# Patient Record
Sex: FEMALE | Race: WHITE | Employment: OTHER | URBAN - METROPOLITAN AREA
[De-identification: names, ages, dates, MRNs, and addresses within clinical notes are randomized per-mention and may not be internally consistent; named-entity substitution may affect disease eponyms.]

---

## 2018-09-25 ENCOUNTER — ANESTHESIA EVENT (OUTPATIENT)
Dept: SURGERY | Facility: CLINIC | Age: 73
End: 2018-09-25
Payer: COMMERCIAL

## 2018-09-26 ENCOUNTER — HOSPITAL ENCOUNTER (OUTPATIENT)
Facility: CLINIC | Age: 73
Discharge: HOME | End: 2018-09-27
Attending: OBSTETRICS & GYNECOLOGY | Admitting: OBSTETRICS & GYNECOLOGY
Payer: COMMERCIAL

## 2018-09-26 ENCOUNTER — ANESTHESIA (OUTPATIENT)
Dept: SURGERY | Facility: CLINIC | Age: 73
End: 2018-09-26
Payer: COMMERCIAL

## 2018-09-26 ENCOUNTER — SURGERY (OUTPATIENT)
Age: 73
End: 2018-09-26
Payer: COMMERCIAL

## 2018-09-26 DIAGNOSIS — N81.10 FEMALE CYSTOCELE: Primary | ICD-10-CM

## 2018-09-26 LAB
ABO + RH BLD: NORMAL
ABO + RH BLD: NORMAL
BLD GP AB SCN SERPL QL: NORMAL
BLOOD BANK CMNT PATIENT-IMP: NORMAL
SPECIMEN EXP DATE BLD: NORMAL

## 2018-09-26 PROCEDURE — 71000012 ZZH RECOVERY PHASE 1 LEVEL 1 FIRST HR: Performed by: OBSTETRICS & GYNECOLOGY

## 2018-09-26 PROCEDURE — 86900 BLOOD TYPING SEROLOGIC ABO: CPT | Performed by: OBSTETRICS & GYNECOLOGY

## 2018-09-26 PROCEDURE — 25000128 H RX IP 250 OP 636: Performed by: OBSTETRICS & GYNECOLOGY

## 2018-09-26 PROCEDURE — 25000125 ZZHC RX 250: Performed by: OBSTETRICS & GYNECOLOGY

## 2018-09-26 PROCEDURE — 25000125 ZZHC RX 250: Performed by: NURSE ANESTHETIST, CERTIFIED REGISTERED

## 2018-09-26 PROCEDURE — 40000935 ZZH STATISTIC OUTPATIENT (NON-OBS) EVE

## 2018-09-26 PROCEDURE — 37000008 ZZH ANESTHESIA TECHNICAL FEE, 1ST 30 MIN: Performed by: OBSTETRICS & GYNECOLOGY

## 2018-09-26 PROCEDURE — 25000128 H RX IP 250 OP 636: Performed by: PHYSICIAN ASSISTANT

## 2018-09-26 PROCEDURE — 25000132 ZZH RX MED GY IP 250 OP 250 PS 637: Performed by: UROLOGY

## 2018-09-26 PROCEDURE — 25000566 ZZH SEVOFLURANE, EA 15 MIN: Performed by: OBSTETRICS & GYNECOLOGY

## 2018-09-26 PROCEDURE — 36000085 ZZH SURGERY LEVEL 8 1ST 30 MIN: Performed by: OBSTETRICS & GYNECOLOGY

## 2018-09-26 PROCEDURE — C1781 MESH (IMPLANTABLE): HCPCS | Performed by: OBSTETRICS & GYNECOLOGY

## 2018-09-26 PROCEDURE — 40000934 ZZH STATISTIC OUTPATIENT (NON-OBS) DAY

## 2018-09-26 PROCEDURE — 37000009 ZZH ANESTHESIA TECHNICAL FEE, EACH ADDTL 15 MIN: Performed by: OBSTETRICS & GYNECOLOGY

## 2018-09-26 PROCEDURE — 36415 COLL VENOUS BLD VENIPUNCTURE: CPT | Performed by: OBSTETRICS & GYNECOLOGY

## 2018-09-26 PROCEDURE — 25000128 H RX IP 250 OP 636: Performed by: NURSE ANESTHETIST, CERTIFIED REGISTERED

## 2018-09-26 PROCEDURE — 71000013 ZZH RECOVERY PHASE 1 LEVEL 1 EA ADDTL HR: Performed by: OBSTETRICS & GYNECOLOGY

## 2018-09-26 PROCEDURE — 86901 BLOOD TYPING SEROLOGIC RH(D): CPT | Performed by: OBSTETRICS & GYNECOLOGY

## 2018-09-26 PROCEDURE — 86850 RBC ANTIBODY SCREEN: CPT | Performed by: OBSTETRICS & GYNECOLOGY

## 2018-09-26 PROCEDURE — 25000128 H RX IP 250 OP 636: Performed by: UROLOGY

## 2018-09-26 PROCEDURE — 25800025 ZZH RX 258: Performed by: UROLOGY

## 2018-09-26 PROCEDURE — 25000128 H RX IP 250 OP 636: Performed by: ANESTHESIOLOGY

## 2018-09-26 PROCEDURE — 88305 TISSUE EXAM BY PATHOLOGIST: CPT | Performed by: OBSTETRICS & GYNECOLOGY

## 2018-09-26 PROCEDURE — 88305 TISSUE EXAM BY PATHOLOGIST: CPT | Mod: 26 | Performed by: OBSTETRICS & GYNECOLOGY

## 2018-09-26 PROCEDURE — 40000936 ZZH STATISTIC OUTPATIENT (NON-OBS) NIGHT

## 2018-09-26 PROCEDURE — 40000169 ZZH STATISTIC PRE-PROCEDURE ASSESSMENT I: Performed by: OBSTETRICS & GYNECOLOGY

## 2018-09-26 PROCEDURE — 25000132 ZZH RX MED GY IP 250 OP 250 PS 637: Performed by: OBSTETRICS & GYNECOLOGY

## 2018-09-26 PROCEDURE — 27210794 ZZH OR GENERAL SUPPLY STERILE: Performed by: OBSTETRICS & GYNECOLOGY

## 2018-09-26 PROCEDURE — 36000087 ZZH SURGERY LEVEL 8 EA 15 ADDTL MIN: Performed by: OBSTETRICS & GYNECOLOGY

## 2018-09-26 DEVICE — MESH SLING Y SHAPE RESTORELLE 24X4CM 501420: Type: IMPLANTABLE DEVICE | Site: PELVIS | Status: FUNCTIONAL

## 2018-09-26 RX ORDER — RIZATRIPTAN BENZOATE 10 MG/1
10 TABLET, ORALLY DISINTEGRATING ORAL
Status: COMPLETED | OUTPATIENT
Start: 2018-09-26 | End: 2018-09-26

## 2018-09-26 RX ORDER — SODIUM CHLORIDE, SODIUM LACTATE, POTASSIUM CHLORIDE, CALCIUM CHLORIDE 600; 310; 30; 20 MG/100ML; MG/100ML; MG/100ML; MG/100ML
INJECTION, SOLUTION INTRAVENOUS CONTINUOUS PRN
Status: DISCONTINUED | OUTPATIENT
Start: 2018-09-26 | End: 2018-09-26

## 2018-09-26 RX ORDER — OXYCODONE AND ACETAMINOPHEN 5; 325 MG/1; MG/1
1-2 TABLET ORAL EVERY 4 HOURS PRN
Qty: 12 TABLET | Refills: 0 | Status: SHIPPED | OUTPATIENT
Start: 2018-09-26 | End: 2018-09-26

## 2018-09-26 RX ORDER — HYDROCODONE BITARTRATE AND ACETAMINOPHEN 5; 325 MG/1; MG/1
1 TABLET ORAL EVERY 4 HOURS PRN
Qty: 20 TABLET | Refills: 0 | Status: SHIPPED | OUTPATIENT
Start: 2018-09-26 | End: 2018-09-26

## 2018-09-26 RX ORDER — CEFAZOLIN SODIUM 1 G/3ML
1 INJECTION, POWDER, FOR SOLUTION INTRAMUSCULAR; INTRAVENOUS SEE ADMIN INSTRUCTIONS
Status: DISCONTINUED | OUTPATIENT
Start: 2018-09-26 | End: 2018-09-26 | Stop reason: HOSPADM

## 2018-09-26 RX ORDER — HYDROMORPHONE HYDROCHLORIDE 1 MG/ML
.3-.5 INJECTION, SOLUTION INTRAMUSCULAR; INTRAVENOUS; SUBCUTANEOUS EVERY 10 MIN PRN
Status: DISCONTINUED | OUTPATIENT
Start: 2018-09-26 | End: 2018-09-26 | Stop reason: HOSPADM

## 2018-09-26 RX ORDER — CEFAZOLIN SODIUM 1 G/3ML
1 INJECTION, POWDER, FOR SOLUTION INTRAMUSCULAR; INTRAVENOUS ONCE
Status: COMPLETED | OUTPATIENT
Start: 2018-09-26 | End: 2018-09-26

## 2018-09-26 RX ORDER — NALOXONE HYDROCHLORIDE 0.4 MG/ML
.1-.4 INJECTION, SOLUTION INTRAMUSCULAR; INTRAVENOUS; SUBCUTANEOUS
Status: DISCONTINUED | OUTPATIENT
Start: 2018-09-26 | End: 2018-09-27 | Stop reason: HOSPADM

## 2018-09-26 RX ORDER — CEFAZOLIN SODIUM 2 G/100ML
2 INJECTION, SOLUTION INTRAVENOUS
Status: DISCONTINUED | OUTPATIENT
Start: 2018-09-26 | End: 2018-09-26 | Stop reason: HOSPADM

## 2018-09-26 RX ORDER — METOCLOPRAMIDE 5 MG/1
5 TABLET ORAL EVERY 6 HOURS PRN
Status: DISCONTINUED | OUTPATIENT
Start: 2018-09-26 | End: 2018-09-27 | Stop reason: HOSPADM

## 2018-09-26 RX ORDER — LIDOCAINE HYDROCHLORIDE 20 MG/ML
INJECTION, SOLUTION INFILTRATION; PERINEURAL PRN
Status: DISCONTINUED | OUTPATIENT
Start: 2018-09-26 | End: 2018-09-26

## 2018-09-26 RX ORDER — OXYCODONE AND ACETAMINOPHEN 5; 325 MG/1; MG/1
1-2 TABLET ORAL EVERY 4 HOURS PRN
Status: DISCONTINUED | OUTPATIENT
Start: 2018-09-26 | End: 2018-09-26

## 2018-09-26 RX ORDER — FENTANYL CITRATE 50 UG/ML
25-50 INJECTION, SOLUTION INTRAMUSCULAR; INTRAVENOUS
Status: DISCONTINUED | OUTPATIENT
Start: 2018-09-26 | End: 2018-09-26 | Stop reason: HOSPADM

## 2018-09-26 RX ORDER — OXYCODONE HYDROCHLORIDE 5 MG/1
5 TABLET ORAL EVERY 6 HOURS PRN
Qty: 12 TABLET | Refills: 0 | Status: SHIPPED | OUTPATIENT
Start: 2018-09-26

## 2018-09-26 RX ORDER — HYDROMORPHONE HYDROCHLORIDE 1 MG/ML
0.2 INJECTION, SOLUTION INTRAMUSCULAR; INTRAVENOUS; SUBCUTANEOUS
Status: DISCONTINUED | OUTPATIENT
Start: 2018-09-26 | End: 2018-09-27 | Stop reason: HOSPADM

## 2018-09-26 RX ORDER — ALBUTEROL SULFATE 0.83 MG/ML
2.5 SOLUTION RESPIRATORY (INHALATION) EVERY 4 HOURS PRN
Status: DISCONTINUED | OUTPATIENT
Start: 2018-09-26 | End: 2018-09-26 | Stop reason: HOSPADM

## 2018-09-26 RX ORDER — ONDANSETRON 4 MG/1
4 TABLET, ORALLY DISINTEGRATING ORAL EVERY 6 HOURS PRN
Status: DISCONTINUED | OUTPATIENT
Start: 2018-09-26 | End: 2018-09-27 | Stop reason: HOSPADM

## 2018-09-26 RX ORDER — PHENAZOPYRIDINE HYDROCHLORIDE 200 MG/1
200 TABLET, FILM COATED ORAL ONCE
Status: COMPLETED | OUTPATIENT
Start: 2018-09-26 | End: 2018-09-26

## 2018-09-26 RX ORDER — ONDANSETRON 2 MG/ML
4 INJECTION INTRAMUSCULAR; INTRAVENOUS EVERY 6 HOURS PRN
Status: DISCONTINUED | OUTPATIENT
Start: 2018-09-26 | End: 2018-09-27 | Stop reason: HOSPADM

## 2018-09-26 RX ORDER — CEFAZOLIN SODIUM 2 G/100ML
2 INJECTION, SOLUTION INTRAVENOUS
Status: COMPLETED | OUTPATIENT
Start: 2018-09-26 | End: 2018-09-26

## 2018-09-26 RX ORDER — ONDANSETRON 4 MG/1
4 TABLET, ORALLY DISINTEGRATING ORAL EVERY 30 MIN PRN
Status: DISCONTINUED | OUTPATIENT
Start: 2018-09-26 | End: 2018-09-26 | Stop reason: HOSPADM

## 2018-09-26 RX ORDER — HYDROCODONE BITARTRATE AND ACETAMINOPHEN 5; 325 MG/1; MG/1
1-2 TABLET ORAL EVERY 4 HOURS PRN
Qty: 20 TABLET | Refills: 0 | Status: SHIPPED | OUTPATIENT
Start: 2018-09-26 | End: 2018-09-26

## 2018-09-26 RX ORDER — BUPIVACAINE HYDROCHLORIDE 2.5 MG/ML
INJECTION, SOLUTION INFILTRATION; PERINEURAL PRN
Status: DISCONTINUED | OUTPATIENT
Start: 2018-09-26 | End: 2018-09-26 | Stop reason: HOSPADM

## 2018-09-26 RX ORDER — KETOROLAC TROMETHAMINE 30 MG/ML
30 INJECTION, SOLUTION INTRAMUSCULAR; INTRAVENOUS EVERY 6 HOURS PRN
Status: DISCONTINUED | OUTPATIENT
Start: 2018-09-26 | End: 2018-09-26 | Stop reason: HOSPADM

## 2018-09-26 RX ORDER — LIDOCAINE 40 MG/G
CREAM TOPICAL
Status: DISCONTINUED | OUTPATIENT
Start: 2018-09-26 | End: 2018-09-27 | Stop reason: HOSPADM

## 2018-09-26 RX ORDER — METOCLOPRAMIDE HYDROCHLORIDE 5 MG/ML
5 INJECTION INTRAMUSCULAR; INTRAVENOUS EVERY 6 HOURS PRN
Status: DISCONTINUED | OUTPATIENT
Start: 2018-09-26 | End: 2018-09-27 | Stop reason: HOSPADM

## 2018-09-26 RX ORDER — KETOROLAC TROMETHAMINE 15 MG/ML
15 INJECTION, SOLUTION INTRAMUSCULAR; INTRAVENOUS EVERY 6 HOURS
Status: DISCONTINUED | OUTPATIENT
Start: 2018-09-26 | End: 2018-09-27

## 2018-09-26 RX ORDER — PROCHLORPERAZINE MALEATE 5 MG
5 TABLET ORAL EVERY 6 HOURS PRN
Status: DISCONTINUED | OUTPATIENT
Start: 2018-09-26 | End: 2018-09-27 | Stop reason: HOSPADM

## 2018-09-26 RX ORDER — ACETAMINOPHEN 650 MG/1
650 SUPPOSITORY RECTAL EVERY 4 HOURS PRN
Status: DISCONTINUED | OUTPATIENT
Start: 2018-09-26 | End: 2018-09-26 | Stop reason: HOSPADM

## 2018-09-26 RX ORDER — PROPOFOL 10 MG/ML
INJECTION, EMULSION INTRAVENOUS PRN
Status: DISCONTINUED | OUTPATIENT
Start: 2018-09-26 | End: 2018-09-26

## 2018-09-26 RX ORDER — ONDANSETRON 2 MG/ML
4 INJECTION INTRAMUSCULAR; INTRAVENOUS EVERY 30 MIN PRN
Status: DISCONTINUED | OUTPATIENT
Start: 2018-09-26 | End: 2018-09-26 | Stop reason: HOSPADM

## 2018-09-26 RX ORDER — ONDANSETRON 2 MG/ML
INJECTION INTRAMUSCULAR; INTRAVENOUS PRN
Status: DISCONTINUED | OUTPATIENT
Start: 2018-09-26 | End: 2018-09-26

## 2018-09-26 RX ORDER — CARBOXYMETHYLCELLULOSE SODIUM 5 MG/ML
1 SOLUTION/ DROPS OPHTHALMIC DAILY PRN
Status: ON HOLD | COMMUNITY
End: 2018-09-26

## 2018-09-26 RX ORDER — NALOXONE HYDROCHLORIDE 0.4 MG/ML
.1-.4 INJECTION, SOLUTION INTRAMUSCULAR; INTRAVENOUS; SUBCUTANEOUS
Status: DISCONTINUED | OUTPATIENT
Start: 2018-09-26 | End: 2018-09-26 | Stop reason: HOSPADM

## 2018-09-26 RX ORDER — SODIUM CHLORIDE 9 MG/ML
INJECTION, SOLUTION INTRAVENOUS CONTINUOUS
Status: DISCONTINUED | OUTPATIENT
Start: 2018-09-26 | End: 2018-09-27 | Stop reason: HOSPADM

## 2018-09-26 RX ORDER — CEFAZOLIN SODIUM 1 G/3ML
INJECTION, POWDER, FOR SOLUTION INTRAMUSCULAR; INTRAVENOUS PRN
Status: DISCONTINUED | OUTPATIENT
Start: 2018-09-26 | End: 2018-09-26

## 2018-09-26 RX ORDER — GLYCOPYRROLATE 0.2 MG/ML
INJECTION, SOLUTION INTRAMUSCULAR; INTRAVENOUS PRN
Status: DISCONTINUED | OUTPATIENT
Start: 2018-09-26 | End: 2018-09-26

## 2018-09-26 RX ORDER — FENTANYL CITRATE 50 UG/ML
INJECTION, SOLUTION INTRAMUSCULAR; INTRAVENOUS PRN
Status: DISCONTINUED | OUTPATIENT
Start: 2018-09-26 | End: 2018-09-26

## 2018-09-26 RX ORDER — SODIUM CHLORIDE, SODIUM LACTATE, POTASSIUM CHLORIDE, CALCIUM CHLORIDE 600; 310; 30; 20 MG/100ML; MG/100ML; MG/100ML; MG/100ML
INJECTION, SOLUTION INTRAVENOUS CONTINUOUS
Status: DISCONTINUED | OUTPATIENT
Start: 2018-09-26 | End: 2018-09-26 | Stop reason: HOSPADM

## 2018-09-26 RX ORDER — NEOSTIGMINE METHYLSULFATE 1 MG/ML
VIAL (ML) INJECTION PRN
Status: DISCONTINUED | OUTPATIENT
Start: 2018-09-26 | End: 2018-09-26

## 2018-09-26 RX ORDER — EPHEDRINE SULFATE 50 MG/ML
INJECTION, SOLUTION INTRAMUSCULAR; INTRAVENOUS; SUBCUTANEOUS PRN
Status: DISCONTINUED | OUTPATIENT
Start: 2018-09-26 | End: 2018-09-26

## 2018-09-26 RX ORDER — MEPERIDINE HYDROCHLORIDE 25 MG/ML
12.5 INJECTION INTRAMUSCULAR; INTRAVENOUS; SUBCUTANEOUS
Status: DISCONTINUED | OUTPATIENT
Start: 2018-09-26 | End: 2018-09-26 | Stop reason: HOSPADM

## 2018-09-26 RX ORDER — CIPROFLOXACIN 500 MG/1
500 TABLET, FILM COATED ORAL 2 TIMES DAILY
Qty: 6 TABLET | Refills: 0 | Status: SHIPPED | OUTPATIENT
Start: 2018-09-26 | End: 2018-09-29

## 2018-09-26 RX ORDER — ASPIRIN 81 MG
100 TABLET, DELAYED RELEASE (ENTERIC COATED) ORAL DAILY
Qty: 30 TABLET | Refills: 1 | Status: SHIPPED | OUTPATIENT
Start: 2018-09-26 | End: 2018-10-26

## 2018-09-26 RX ORDER — OXYCODONE HYDROCHLORIDE 5 MG/1
5 TABLET ORAL EVERY 4 HOURS PRN
Status: DISCONTINUED | OUTPATIENT
Start: 2018-09-26 | End: 2018-09-27 | Stop reason: HOSPADM

## 2018-09-26 RX ORDER — MAGNESIUM HYDROXIDE 1200 MG/15ML
LIQUID ORAL PRN
Status: DISCONTINUED | OUTPATIENT
Start: 2018-09-26 | End: 2018-09-26 | Stop reason: HOSPADM

## 2018-09-26 RX ORDER — ESTRADIOL 0.1 MG/G
1 CREAM VAGINAL DAILY
Qty: 30 G | Refills: 0 | Status: SHIPPED | OUTPATIENT
Start: 2018-09-26 | End: 2018-09-27

## 2018-09-26 RX ORDER — DEXAMETHASONE SODIUM PHOSPHATE 4 MG/ML
INJECTION, SOLUTION INTRA-ARTICULAR; INTRALESIONAL; INTRAMUSCULAR; INTRAVENOUS; SOFT TISSUE PRN
Status: DISCONTINUED | OUTPATIENT
Start: 2018-09-26 | End: 2018-09-26

## 2018-09-26 RX ADMIN — SODIUM CHLORIDE, POTASSIUM CHLORIDE, SODIUM LACTATE AND CALCIUM CHLORIDE: 600; 310; 30; 20 INJECTION, SOLUTION INTRAVENOUS at 07:32

## 2018-09-26 RX ADMIN — LIDOCAINE HYDROCHLORIDE 40 MG: 20 INJECTION, SOLUTION INFILTRATION; PERINEURAL at 07:39

## 2018-09-26 RX ADMIN — ONDANSETRON 4 MG: 2 INJECTION INTRAMUSCULAR; INTRAVENOUS at 16:15

## 2018-09-26 RX ADMIN — GLYCOPYRROLATE 0.2 MG: 0.2 INJECTION, SOLUTION INTRAMUSCULAR; INTRAVENOUS at 08:16

## 2018-09-26 RX ADMIN — PROCHLORPERAZINE EDISYLATE 5 MG: 5 INJECTION INTRAMUSCULAR; INTRAVENOUS at 20:00

## 2018-09-26 RX ADMIN — Medication 5 MG: at 08:34

## 2018-09-26 RX ADMIN — FENTANYL CITRATE 75 MCG: 50 INJECTION, SOLUTION INTRAMUSCULAR; INTRAVENOUS at 08:01

## 2018-09-26 RX ADMIN — CEFAZOLIN 1 G: 1 INJECTION, POWDER, FOR SOLUTION INTRAMUSCULAR; INTRAVENOUS at 13:29

## 2018-09-26 RX ADMIN — ROCURONIUM BROMIDE 30 MG: 10 INJECTION INTRAVENOUS at 07:39

## 2018-09-26 RX ADMIN — KETOROLAC TROMETHAMINE 15 MG: 15 INJECTION, SOLUTION INTRAMUSCULAR; INTRAVENOUS at 18:00

## 2018-09-26 RX ADMIN — BUPIVACAINE HYDROCHLORIDE 30 ML: 2.5 INJECTION, SOLUTION EPIDURAL; INFILTRATION; INTRACAUDAL; PERINEURAL at 10:32

## 2018-09-26 RX ADMIN — MIDAZOLAM 2 MG: 1 INJECTION INTRAMUSCULAR; INTRAVENOUS at 07:34

## 2018-09-26 RX ADMIN — VASOPRESSIN 4 ML: 20 INJECTION INTRAVENOUS at 10:31

## 2018-09-26 RX ADMIN — Medication 0.5 MG: at 12:32

## 2018-09-26 RX ADMIN — SILVER NITRATE APPLICATORS 2 APPLICATOR: 25; 75 STICK TOPICAL at 08:30

## 2018-09-26 RX ADMIN — KETOROLAC TROMETHAMINE 30 MG: 30 INJECTION, SOLUTION INTRAMUSCULAR at 11:25

## 2018-09-26 RX ADMIN — FENTANYL CITRATE 25 MCG: 50 INJECTION, SOLUTION INTRAMUSCULAR; INTRAVENOUS at 07:39

## 2018-09-26 RX ADMIN — GLYCOPYRROLATE 0.2 MG: 0.2 INJECTION, SOLUTION INTRAMUSCULAR; INTRAVENOUS at 10:43

## 2018-09-26 RX ADMIN — CEFAZOLIN SODIUM 2 G: 2 INJECTION, SOLUTION INTRAVENOUS at 07:54

## 2018-09-26 RX ADMIN — Medication 0.5 MG: at 11:55

## 2018-09-26 RX ADMIN — FENTANYL CITRATE 25 MCG: 50 INJECTION, SOLUTION INTRAMUSCULAR; INTRAVENOUS at 09:53

## 2018-09-26 RX ADMIN — GLYCOPYRROLATE 0.2 MG: 0.2 INJECTION, SOLUTION INTRAMUSCULAR; INTRAVENOUS at 10:45

## 2018-09-26 RX ADMIN — PHENAZOPYRIDINE HYDROCHLORIDE 200 MG: 200 TABLET ORAL at 06:37

## 2018-09-26 RX ADMIN — ROCURONIUM BROMIDE 10 MG: 10 INJECTION INTRAVENOUS at 08:03

## 2018-09-26 RX ADMIN — DEXAMETHASONE SODIUM PHOSPHATE 4 MG: 4 INJECTION, SOLUTION INTRA-ARTICULAR; INTRALESIONAL; INTRAMUSCULAR; INTRAVENOUS; SOFT TISSUE at 08:04

## 2018-09-26 RX ADMIN — SODIUM CHLORIDE, POTASSIUM CHLORIDE, SODIUM LACTATE AND CALCIUM CHLORIDE: 600; 310; 30; 20 INJECTION, SOLUTION INTRAVENOUS at 09:21

## 2018-09-26 RX ADMIN — ONDANSETRON 4 MG: 2 INJECTION INTRAMUSCULAR; INTRAVENOUS at 10:24

## 2018-09-26 RX ADMIN — Medication 2.5 MG: at 08:46

## 2018-09-26 RX ADMIN — Medication 1 LOZENGE: at 16:15

## 2018-09-26 RX ADMIN — ROCURONIUM BROMIDE 20 MG: 10 INJECTION INTRAVENOUS at 08:53

## 2018-09-26 RX ADMIN — RIZATRIPTAN BENZOATE 10 MG: 10 TABLET, ORALLY DISINTEGRATING ORAL at 21:42

## 2018-09-26 RX ADMIN — NEOSTIGMINE METHYLSULFATE 1 MG: 1 INJECTION, SOLUTION INTRAVENOUS at 10:43

## 2018-09-26 RX ADMIN — CEFAZOLIN 1 G: 1 INJECTION, POWDER, FOR SOLUTION INTRAMUSCULAR; INTRAVENOUS at 09:59

## 2018-09-26 RX ADMIN — ROCURONIUM BROMIDE 10 MG: 10 INJECTION INTRAVENOUS at 09:37

## 2018-09-26 RX ADMIN — SODIUM CHLORIDE 1000 ML: 900 IRRIGANT IRRIGATION at 08:12

## 2018-09-26 RX ADMIN — PROPOFOL 170 MG: 10 INJECTION, EMULSION INTRAVENOUS at 07:39

## 2018-09-26 RX ADMIN — NEOSTIGMINE METHYLSULFATE 2 MG: 1 INJECTION, SOLUTION INTRAVENOUS at 10:45

## 2018-09-26 RX ADMIN — Medication 0.2 MG: at 15:06

## 2018-09-26 RX ADMIN — FENTANYL CITRATE 25 MCG: 50 INJECTION INTRAMUSCULAR; INTRAVENOUS at 11:22

## 2018-09-26 RX ADMIN — GLYCOPYRROLATE 0.1 MG: 0.2 INJECTION, SOLUTION INTRAMUSCULAR; INTRAVENOUS at 08:10

## 2018-09-26 RX ADMIN — ROCURONIUM BROMIDE 5 MG: 10 INJECTION INTRAVENOUS at 09:52

## 2018-09-26 RX ADMIN — SODIUM CHLORIDE: 9 INJECTION, SOLUTION INTRAVENOUS at 13:19

## 2018-09-26 RX ADMIN — GLYCOPYRROLATE 0.1 MG: 0.2 INJECTION, SOLUTION INTRAMUSCULAR; INTRAVENOUS at 08:12

## 2018-09-26 RX ADMIN — ROCURONIUM BROMIDE 20 MG: 10 INJECTION INTRAVENOUS at 08:22

## 2018-09-26 ASSESSMENT — ENCOUNTER SYMPTOMS: ORTHOPNEA: 0

## 2018-09-26 NOTE — PROGRESS NOTES
Admission medication history interview status for the 9/26/2018  admission is complete. See EPIC admission navigator for prior to admission medications     Medication history source reliability:Moderate    Medication history interview source(s):Patient    Medication history resources (including written lists, pill bottles, clinic record):Mailed in list    Primary pharmacy[facility]    Additional medication history information not noted on PTA med list :None    Time spent in this activity: 30 minutes    Prior to Admission medications    Medication Sig Last Dose Taking? Auth Provider   Acetaminophen (TYLENOL PO) Take 1,000 mg by mouth daily as needed for mild pain or fever 9/24/2018 at Unknown Yes Reported, Patient   Carboxymethylcellulose Sod PF (REFRESH PLUS) 0.5 % SOLN ophthalmic solution Place 1 drop into both eyes daily as needed for dry eyes Past Month at Unknown time Yes Reported, Patient   FAMCICLOVIR PO Take 250 mg by mouth 2 times daily as needed (outbreaks) (2 x 125mg = 250mg) for five days. 1+ month at Unknown Yes Reported, Patient   Ibuprofen (ADVIL PO) Take 400 mg by mouth 2 times daily as needed for moderate pain 9/14/2018 at unknown Yes Reported, Patient   OMEPRAZOLE PO Take 20 mg by mouth daily 9/26/2018 at 0500 Yes Reported, Patient   Rizatriptan Benzoate (MAXALT-MLT PO) Take 10 mg by mouth once as needed for migraine 1+ month at Unknown Yes Reported, Patient   vitamin B complex with vitamin C (VITAMIN  B COMPLEX) TABS tablet Take 1 tablet by mouth daily 9/25/2018 at AM Yes Reported, Patient   VITAMIN D, CHOLECALCIFEROL, PO Take 2,000 Units by mouth daily 9/25/2018 at AM Yes Reported, Patient

## 2018-09-26 NOTE — IP AVS SNAPSHOT
MRN:[id number]                      After Visit Summary   9/26/2018    Amaya Fregoso    MRN: [id number]           Thank you!     Thank you for choosing [facility] for your care. Our goal is always to provide you with excellent care. Hearing back from our patients is one way we can continue to improve our services. Please take a few minutes to complete the written survey that you may receive in the mail after you visit with us. Thank you!        Patient Information     Date Of Birth          1945        About your hospital stay     You were admitted on:  September 26, 2018 [facility]    You were discharged on:  September 27, 2018        Reason for your hospital stay       Surgery                  Who to Call     For medical emergencies, please call 911.  For non-urgent questions about your medical care, please call your primary care provider or clinic, [phone]  For questions related to your surgery, please call your surgery clinic        Attending Provider     Provider Specialty    Angel Baer MD OB/Gyn    James Reyes MD Urology       Primary Care Provider Office Phone # Fax #    Danii Pierce [phone]      After Care Instructions     Activity       Activity: no lifting more than 10 lbs for 6-8 weeks; limit strenuous activity including intercourse for 6 weeks minimum but 8 weeks is better. Gradually increase your activity as tolerated. Plan on not driving for a couple of days after surgery, though you may feel like doing so sooner.            Diet       Follow this diet upon discharge: Regular Diet            Diet Instructions       Resume pre-procedure diet            Discharge Instructions       Patient to follow up with appointment in 3-5 weeks            Dressing       Keep dressing clean and dry.  Dressing / incisional care as instructed by RN and or Surgeon            No Alcohol       For 24 hours  post procedure            No driving or operating machinery        until the day after procedure            No lifting        No lifting over 10 lbs and no strenuous physical activity for 8 weeks            Shower       No shower for 48 hours post procedure. May shower Postoperative Day (POD)  2            Tubes and drains       You are going home with a crook catheter. You will need to see your doctor to have this removed. Wash where the catheter enters the urethra daily with mild soap and water. Empty the bag 4-6 times per day or when it becomes full. You can empty the contents into the toilet.            Wound care and dressings       Keep your incisions clean and dry.                  Follow-up Appointments     Follow-up and recommended labs and tests        Follow up with Dr. Reyes in clinic on 10/23 at 1:10pm            Follow-up and recommended labs and tests        Follow up with a nurse visit at Dr. Reyes's clinic on Monday 10/1 to have your crook catheter removed.    [facility]  [address] [place] [address], [address]  [place]. [address]  You may call [phone] with any questions or concerns.   Central Appointment #: [phone]                  Further instructions from your care team       .33    Pending Results     Date and Time Order Name Status Description    9/26/2018 1011 Surgical pathology exam In process             Statement of Approval     Ordered          09/26/18 1252  I have reviewed and agree with all the recommendations and orders detailed in this document.  EFFECTIVE NOW     Approved and electronically signed by:  Kendal Reyes PA-C             Admission Information     Date & Time Provider Department Dept. Phone    9/26/2018 James Reyes MD [facility] [phone]      Your Vitals Were     Blood Pressure Temperature Respirations Height Weight Pulse Oximetry    128/70 98.3  F (36.8  C)  "(Oral) 16 1.524 m (5') 63.6 kg (140 lb 4.8 oz) 93%    BMI (Body Mass Index)                   27.4 kg/m2           [employer] Information     [employer] lets you send messages to your doctor, view your test results, renew your prescriptions, schedule appointments and more. To sign up, go to [email or web address] . Click on \"Log in\" on the left side of the screen, which will take you to the Welcome page. Then click on \"Sign up Now\" on the right side of the page.     You will be asked to enter the access code listed below, as well as some personal information. Please follow the directions to create your username and password.     Your access code is: [email or web address]  Expires: 2018  2:58 PM     Your access code will  in 90 days. If you need help or a new code, please call your [facility] clinic or [phone].        Care EveryWhere ID     This is your Care EveryWhere ID. This could be used by other organizations to access your [facility] medical records  [id number]        Equal Access to Services     DONALD BERNAL AH: Hadalexis Deal, tammi anne, mario he, summer mejia . So [facility] [phone].    ATENCIÓN: Si habla español, tiene a mccrary disposición servicios gratuitos de asistencia lingüística. Bairon al [phone].    We comply with applicable federal civil rights laws and [place] laws. We do not discriminate on the basis of race, color, national origin, age, disability, sex, sexual orientation, or gender identity.               Review of your medicines      START taking        Dose / Directions    ciprofloxacin 500 MG tablet   Commonly known as:  CIPRO        Dose:  500 mg   Take 1 tablet (500 mg) by mouth 2 times daily for 3 days   Quantity:  6 tablet   Refills:  0       docusate sodium 100 MG tablet   Commonly known as:  COLACE        Dose:  100 mg   Take 100 mg by mouth daily   Quantity:  30 tablet   Refills:  1       estradiol 0.1 MG/GM cream "   Commonly known as:  ESTRACE VAGINAL        Dose:  1 g   Place 1 g vaginally daily Apply pea size amount to finger and apply to vagina once daily qhs   Quantity:  30 g   Refills:  0       oxybutynin 5 MG 24 hr tablet   Commonly known as:  DITROPAN XL        Dose:  5 mg   Take 1 tablet (5 mg) by mouth daily as needed for bladder spasms   Quantity:  30 tablet   Refills:  0       oxyCODONE IR 5 MG tablet   Commonly known as:  ROXICODONE        Dose:  5 mg   Take 1 tablet (5 mg) by mouth every 6 hours as needed for pain   Quantity:  12 tablet   Refills:  0         CONTINUE these medicines which have NOT CHANGED        Dose / Directions    ADVIL PO        Dose:  400 mg   Take 400 mg by mouth 2 times daily as needed for moderate pain   Refills:  0       FAMCICLOVIR PO        Dose:  250 mg   Take 250 mg by mouth 2 times daily as needed (outbreaks) (2 x 125mg = 250mg) for five days.   Refills:  0       MAXALT-MLT PO        Dose:  10 mg   Take 10 mg by mouth once as needed for migraine   Refills:  0       TYLENOL PO        Dose:  1000 mg   Take 1,000 mg by mouth daily as needed for mild pain or fever   Refills:  0         STOP taking     Carboxymethylcellulose Sod PF 0.5 % Soln ophthalmic solution   Commonly known as:  REFRESH PLUS           OMEPRAZOLE PO           vitamin B complex with vitamin C Tabs tablet           VITAMIN D (CHOLECALCIFEROL) PO                Where to get your medicines      These medications were sent to [facility] - [address]  [address], [place] [address]     Phone:  [phone]     ciprofloxacin 500 MG tablet    docusate sodium 100 MG tablet    oxybutynin 5 MG 24 hr tablet         Some of these will need a paper prescription and others can be bought over the counter. Ask your nurse if you have questions.     Bring a paper prescription for each of these medications     estradiol 0.1 MG/GM cream    oxyCODONE IR 5 MG tablet                Protect  others around you: Learn how to safely use, store and throw away your medicines at www.disposemymeds.org.        ANTIBIOTIC INSTRUCTION     You've Been Prescribed an Antibiotic - Now What?  Your healthcare team thinks that you or your loved one might have an infection. Some infections can be treated with antibiotics, which are powerful, life-saving drugs. Like all medications, antibiotics have side effects and should only be used when necessary. There are some important things you should know about your antibiotic treatment.      Your healthcare team may run tests before you start taking an antibiotic.    Your team may take samples (e.g., from your blood, urine or other areas) to run tests to look for bacteria. These test can be important to determine if you need an antibiotic at all and, if you do, which antibiotic will work best.      Within a few days, your healthcare team might change or even stop your antibiotic.    Your team may start you on an antibiotic while they are working to find out what is making you sick.    Your team might change your antibiotic because test results show that a different antibiotic would be better to treat your infection.    In some cases, once your team has more information, they learn that you do not need an antibiotic at all. They may find out that you don't have an infection, or that the antibiotic you're taking won't work against your infection. For example, an infection caused by a virus can't be treated with antibiotics. Staying on an antibiotic when you don't need it is more likely to be harmful than helpful.      You may experience side effects from your antibiotic.    Like all medications, antibiotics have side effects. Some of these can be serious.    Let you healthcare team know if you have any known allergies when you are admitted to the hospital.    One significant side effect of nearly all antibiotics is the risk of severe and sometimes deadly diarrhea caused by  Clostridium difficile (C. Difficile). This occurs when a person takes antibiotics because some good germs are destroyed. Antibiotic use allows C. diificile to take over, putting patients at high risk for this serious infection.    As a patient or caregiver, it is important to understand your or your loved one's antibiotic treatment. It is especially important for caregivers to speak up when patients can't speak for themselves. Here are some important questions to ask your healthcare team.    What infection is this antibiotic treating and how do you know I have that infection?    What side effects might occur from this antibiotic?    How long will I need to take this antibiotic?    Is it safe to take this antibiotic with other medications or supplements (e.g., vitamins) that I am taking?     Are there any special directions I need to know about taking this antibiotic? For example, should I take it with food?    How will I be monitored to know whether my infection is responding to the antibiotic?    What tests may help to make sure the right antibiotic is prescribed for me?      Information provided by:  www.cdc.gov/getsmart  U.S. Department of Health and Human Services  Centers for disease Control and Prevention  National Center for Emerging and Zoonotic Infectious Diseases  Division of Healthcare Quality Promotion        Information about OPIOIDS     PRESCRIPTION OPIOIDS: WHAT YOU NEED TO KNOW   We gave you an opioid (narcotic) pain medicine. It is important to manage your pain, but opioids are not always the best choice. You should first try all the other options your care team gave you. Take this medicine for as short a time (and as few doses) as possible.    Some activities can increase your pain, such as bandage changes or therapy sessions. It may help to take your pain medicine 30 to 60 minutes before these activities. Reduce your stress by getting enough sleep, working on hobbies you enjoy and practicing  relaxation or meditation. Talk to your care team about ways to manage your pain beyond prescription opioids.    These medicines have risks:    DO NOT drive when on new or higher doses of pain medicine. These medicines can affect your alertness and reaction times, and you could be arrested for driving under the influence (DUI). If you need to use opioids long-term, talk to your care team about driving.    DO NOT operate heavy machinery    DO NOT do any other dangerous activities while taking these medicines.    DO NOT drink any alcohol while taking these medicines.     If the opioid prescribed includes acetaminophen, DO NOT take with any other medicines that contain acetaminophen. Read all labels carefully. Look for the word  acetaminophen  or  Tylenol.  Ask your pharmacist if you have questions or are unsure.    You can get addicted to pain medicines, especially if you have a history of addiction (chemical, alcohol or substance dependence). Talk to your care team about ways to reduce this risk.    All opioids tend to cause constipation. Drink plenty of water and eat foods that have a lot of fiber, such as fruits, vegetables, prune juice, apple juice and high-fiber cereal. Take a laxative (Miralax, milk of magnesia, Colace, Senna) if you don t move your bowels at least every other day. Other side effects include upset stomach, sleepiness, dizziness, throwing up, tolerance (needing more of the medicine to have the same effect), physical dependence and slowed breathing.    Store your pills in a secure place, locked if possible. We will not replace any lost or stolen medicine. If you don t finish your medicine, please throw away (dispose) as directed by your pharmacist. The [place] Pollution Control Agency has more information about safe disposal: https://www.pca.[facility].[place].us/living-green/managing-unwanted-medications             Medication List: This is a list of all your medications and when to take them. Check marks  below indicate your daily home schedule. Keep this list as a reference.      Medications           Morning Afternoon Evening Bedtime As Needed    ADVIL PO   Take 400 mg by mouth 2 times daily as needed for moderate pain                                   ciprofloxacin 500 MG tablet   Commonly known as:  CIPRO   Take 1 tablet (500 mg) by mouth 2 times daily for 3 days                                      docusate sodium 100 MG tablet   Commonly known as:  COLACE   Take 100 mg by mouth daily                                   estradiol 0.1 MG/GM cream   Commonly known as:  ESTRACE VAGINAL   Place 1 g vaginally daily Apply pea size amount to finger and apply to vagina once daily qhs                                   FAMCICLOVIR PO   Take 250 mg by mouth 2 times daily as needed (outbreaks) (2 x 125mg = 250mg) for five days.                                   MAXALT-MLT PO   Take 10 mg by mouth once as needed for migraine   Last time this was given:  10 mg on 9/26/2018  9:42 PM                                   oxybutynin 5 MG 24 hr tablet   Commonly known as:  DITROPAN XL   Take 1 tablet (5 mg) by mouth daily as needed for bladder spasms                                   oxyCODONE IR 5 MG tablet   Commonly known as:  ROXICODONE   Take 1 tablet (5 mg) by mouth every 6 hours as needed for pain                                   TYLENOL PO   Take 1,000 mg by mouth daily as needed for mild pain or fever

## 2018-09-26 NOTE — ANESTHESIA CARE TRANSFER NOTE
Patient: Amaya Fregoso    Procedure(s):  ROBOTIC ASSISTED SUPRACERVICAL HYSTERECTOMY, BILATERAL SALPINGO OOPHORECTOMY.LYSIS OF ADHESIONS. (ROSIE) ROBOTIC SACROCOLPOPEXY, CYSTOSCOPY, (AB)  RECTOCELE (DR VELEZ)  - Wound Class: I-Clean   - Wound Class: II-Clean Contaminated   - Wound Class: II-Clean Contaminated   - Wound Class: II-Clean Contaminated   - Wound Class: I-Clean    Diagnosis: genital prolapse   Diagnosis Additional Information: No value filed.    Anesthesia Type:   General, ETT     Note:  Airway :Face Mask  Patient transferred to:PACU  Comments: Spont. Resps, pt. Responding.  Extubated, sufficient air exchange. To PACU VSS, Monitors on. Report to RNHandoff Report: Identifed the Patient, Identified the Reponsible Provider, Reviewed the pertinent medical history, Discussed the surgical course, Reviewed Intra-OP anesthesia mangement and issues during anesthesia, Set expectations for post-procedure period and Allowed opportunity for questions and acknowledgement of understanding      Vitals: (Last set prior to Anesthesia Care Transfer)    CRNA VITALS  9/26/2018 1024 - 9/26/2018 1101      9/26/2018             NIBP: 114/73    NIBP Mean: 92                Electronically Signed By: MEHNAZ Rangel CRNA  September 26, 2018  11:01 AM

## 2018-09-26 NOTE — ANESTHESIA POSTPROCEDURE EVALUATION
Patient: Amaya Fregoso    Procedure(s):  ROBOTIC ASSISTED SUPRACERVICAL HYSTERECTOMY, BILATERAL SALPINGO OOPHORECTOMY.LYSIS OF ADHESIONS. (ROSIE) ROBOTIC SACROCOLPOPEXY, CYSTOSCOPY, (AB)  RECTOCELE (DR VELEZ)  - Wound Class: I-Clean   - Wound Class: II-Clean Contaminated   - Wound Class: II-Clean Contaminated   - Wound Class: II-Clean Contaminated   - Wound Class: I-Clean    Diagnosis:genital prolapse   Diagnosis Additional Information: No value filed.    Anesthesia Type:  General, ETT    Note:  Anesthesia Post Evaluation    Patient location during evaluation: PACU  Patient participation: Able to fully participate in evaluation  Level of consciousness: awake  Pain management: adequate  Airway patency: patent  Cardiovascular status: acceptable  Respiratory status: acceptable  Hydration status: acceptable  PONV: controlled     Anesthetic complications: None          Last vitals:  Vitals:    09/26/18 1406 09/26/18 1506 09/26/18 1619   BP: 117/57  110/63   Resp: 14 16 16   Temp:   35.4  C (95.8  F)   SpO2: 97% 98% 94%         Electronically Signed By: Meaghan Wiley MD  September 26, 2018  5:41 PM

## 2018-09-26 NOTE — OP NOTE
Procedure Date: 09/26/2018      PREOPERATIVE DIAGNOSES:  Symptomatic pelvic organ prolapse, right ovarian nodule.      POSTOPERATIVE DIAGNOSES:  Symptomatic pelvic organ prolapse, right ovarian nodule, with minimal pelvic adhesions, 1 cm cervical nodule, 1.5 cm paraovarian nodule.      SURGEON:  Angel Baer MD       SECONDARY SURGEON:  James Reyes MD      ASSISTANT:  Kendal Reyes PA-C      ANESTHESIA:  General.      BLOOD LOSS:  10 mL      PREOPERATIVE STATUS:  Mrs. Amaya Fregoso is a 72-year-old multiparous female who is to undergo the above-named procedure for symptomatic pelvic organ prolapse.  The patient was originally seen by myself on 09/14/2018.  She was referred By Dr. James Reyes for evaluation of pelvic organ prolapse.  The patient had had pelvic prolapse for several years.  She subsequently was evaluated by Dr. Reyes, who confirmed the diagnosis and discussed with the patient options of management.  One option was surgical repair.  The patient arranged an appointment with myself and was seen on 09/14/2018.  Her symptoms were reviewed and consisted of primarily a vaginal bulge that was resulting in discomfort.  The patient also had pelvic pressure that was worsening and led her to seek medical care.  The patient had also been evaluated By Dr. Maya Chan for evaluation of a rectocele.  Dr. Chan performed thorough pelvic floor testing, including defecography, and this was unremarkable.  Dr. Chan determined that the patient did not require a ventral rectopexy.  The patient was subsequently referred to myself for evaluation for a possible combined surgical procedure.  She was examined and was found to have grade 3 cystocele with grade 2-3 uterine and apical prolapse, as well as a grade 1-2 rectocele.  The patient then had a pelvic ultrasound which showed a normal-sized uterus and what appeared to be a small fibroid on the anterior lower uterine wall.  The endometrial  stripe was 2.5 mm.  There was a small amount of fluid in the canal space.  The right ovary contained an inhomogeneous area with a hyperechoic center.  This measured approximately 1.7 cm.  The left ovary was not identified.  The patient did have a CA-125 for preoperative evaluation and this was found to be normal, with a value of 6 U/mL.  The patient was then counseled by myself regarding a possible combined minimally invasive prolapse surgery.  She was very interested in proceeding with surgical repair.  We discussed a da Ami supracervical hysterectomy with bilateral salpingo-oophorectomy and possible rectocele repair in combination with a da Ami sacrocolpopexy and cystoscopy.  It had been determined that the patient did not require a midurethral sling.  We also discussed conservative measures such as observation alone and/or a pessary.  The patient considered her options and elected to have surgical repair.      A preoperative history and physical was performed by her primary care provider and she was felt to be a satisfactory candidate for surgery.  I explained to the patient the proposed surgery, including the pros, cons, risks, benefits and potential complications.  These complications include, but are not limited to the risk of anesthesia, bleeding, transfusion, injury to normal surrounding structures including, but not limited to the bowel, bladder, ureters, major blood vessels and nerves.  The patient was aware that mesh material was to be used during the surgery.  She was aware of the FDA warnings and concerns regarding mesh.  After a thorough and complete consultation, the patient elected to proceed.  Appropriate consent forms were signed the morning of surgery.  All of her questions were answered, as well as those of her family.      OPERATIVE FINDINGS:  At the time of examination under anesthesia, a large grade 3 cystocele was present.  A grade 2-3 uterine and apical prolapse was present.  A smaller  grade 1-2 rectocele was identified.  At the time of da Ami laparoscopy, there were filmy adhesions of the omentum to the right round ligament.  These were lysed.  The uterus was anterior and was of normal size.  The uterine serosa was normal.  The left ovary was normal, as was the left fallopian tube, which had been previously ligated.  The right fallopian tube and ovary were also normal.  There was a 1.5 cm right paraovarian nodule that was smooth-walled.  This was removed with the uterine specimen.  During the dissection, it was also identified that the patient had a 1 to 1.5 cm soft tissue mass anteriorly at the junction of the cervix and the uterine corpus.  This was excised and submitted separately as a cervical nodule.  This appeared to be consistent with a uterine fibroid.  The course of the ureters was identified throughout the entire procedure.  There was no evidence of any intraoperative complications.  Dr. Reyes performed cystoscopy at the conclusion of the procedure and the bladder was normal.  The anatomic results of the sacrocolpopexy were excellent.  On examination following the sacrocolpopexy, a grade 1 to 1+ rectocele remained.  This was therefore repaired in the usual fashion with a posterior colporrhaphy/rectocele repair.  There were no intraoperative complications.  The blood loss was minimal at 10 mL.      OPERATIVE PROCEDURE:  Mrs. Fregoso was taken to the operating room and placed in the supine position.  Both intravenous and inhalation anesthesia were administered and endotracheal intubation was performed.  Examination under anesthesia was performed with the findings documented above.  The vagina, perineum and abdomen were then prepped and draped in the usual fashion for combined vaginal and abdominal surgery.  Once the patient was appropriately prepped and draped, all instruments were readied.  A timeout was then performed, during which the patient's name, medical record number and  date of birth were reviewed.  We reviewed the proposed surgical procedure and medications that were on the surgical table.  We acknowledged that the patient had received 2 grams of Ancef.  All members of the operating room staff were in agreement with the patient's identity, the surgery to be performed and the medications given.  Once the timeout was concluded, the surgery began.        I began the surgery vaginally by placing a Trejo catheter.  A tenaculum was placed on the cervix.  The cervix was then dilated to a size #6 Hegar dilator.  A Hulka tenaculum was then placed with the tip of a red Menchaca catheter covering the cannula of the Hulka tenaculum.  The cannula was placed through the endocervical canal and into the lower uterine segment.  The tenaculum was attached to the anterior cervix.  This was to be used for uterine manipulation and for identification of the endocervical canal at the time of supracervical hysterectomy.        Dr. Reyes and her assistant placed the da Ami abdominal ports.  This included a camera port in a supraumbilical incision, 3 da Ami 8 mm ports, 2 on the left side of the abdomen and 1 on the right, and an additional port for the surgical assistant and the AirSeal.  The ports were placed under direct visualization and without evidence of injury.  Once all ports were in place, the surgical table was brought down to its lowermost position and the patient was placed in a steep Trendelenburg position.  The da Ami Xi was then brought into the surgical field and was docked.  Instruments were then placed through the ports.  Through arm #1 was a da Ami ProGrasp, arm #2 was a da Ami long bipolar forceps, arm #3 was the scope and arm #4 was the da Ami monopolar scissors.  All of these instruments were placed under direct visualization and without evidence of injury to underlying structures.        I then took my position at the StyleCaster console and took control of the  instruments.  The proper placement of the ports was confirmed.  The bowel was then brought out from the pelvis, including the sigmoid colon.  The adhesion of the omentum to the right round ligament was identified and lysed under direct visualization.  Care was taken to avoid injury to underlying bowel.  The intestinal contents were then mobilized and were kept out of the pelvis.  Complete and thorough examination of the pelvic structures was then performed.  The course of both ureters was identified.  The right paraovarian nodule was identified and was noted to be on a short stalk.  This right paraovarian nodule was removed with the remainder of the uterine and adnexal specimens.  Once a complete and thorough evaluation of the pelvic structures was completed, the surgery began.        The infundibulopelvic ligament on both sides was cauterized and cut.  This dissection was carried along the mesosalpinx to the round ligaments on both sides.  The round ligaments were cauterized and cut.  The anterior leaf of the broad ligament was incised to a level over the cervix.  The bladder was then dissected away from the cervix and upper vagina.  There was no evidence of any injury.  The cauterization was then performed along the uterus, incorporating the parametria and the uterine vascular pedicles.  It was at this time that the cervical nodule was identified at the juncture of the cervix and the lower uterine segment.  This 1 to 1.5 cm nodule was excised and submitted separately from the uterine specimen.  It was felt to be a uterine fibroid.  The dissection was carried along both sides of the uterus down to a level below the internal os.  Hemostasis was present.  There was no evidence of ureteral injury.  The supracervical hysterectomy was then performed using the monopolar scissors.  The cervix was amputated just below the internal os.  The endocervical canal was entered and the tip of the red Menchaca catheter was  identified.  The tenaculum was then hinged and this allowed complete transection and completion of the supracervical hysterectomy.  The uterine specimen with both tubes and ovaries, as well as the right adnexal nodule, were placed into an EndoCatch bag to be removed at the end of the surgery.  Hemostasis was confirmed on the cervical stump.  The tenaculum was removed and replaced with a stick sponge.  The endocervical canal was cauterized with a bipolar forceps.  Complete hemostasis was present.  The uterus with both tubes and ovaries, as well as the right adnexal nodule, were within the EndoCatch bag.  The cervical nodule was also in the bag.      Dr. Reyes then proceeded to perform the sacrocolpopexy.  I remained in the operating room and assisted Dr. Reyes at the perineum with vaginal retraction and identification of proper tension for the sacrocolpopexy.  She completed the entire sacrocolpopexy and I then rescrubbed and entered the operating room at the patient's bedside.        The laparoscopic ports were removed after desufflation of the abdomen.  Hemostasis was present on all the abdominal incisions.  The uterine specimen within the EndoCatch bag was then removed through the supraumbilical incision.  The supraumbilical incision was closed in 3 layers, the 1st being the fascial layer closed with a running unlocked stitch of 0 Vicryl, the subcutaneous was reapproximated using 3-0 Vicryl and the skin was closed using 4-0 Monocryl.  Dermabond was also placed.  The remaining abdominal incisions were closed by Dr. Reyes's physician assistants.  Dr. Reyes had performed cystoscopy, which was normal.  It was determined that the patient did not require a midurethral sling.      Examination of the patient found a residual grade 1 to 1+ rectocele.  Repair was felt to be indicated.  The rectocele repair was performed in the usual fashion.  Allis clamps were placed at the introitus.  The posterior vaginal  mucosa was injected with a dilute vasopressin solution.  An incision was made and continued along the posterior vaginal wall.  Caba clamps were placed on the cut edges of vaginal mucosa.  The underlying rectocele was dissected away from the vaginal mucosa.  Plication sutures of 2-0 Vicryl were then placed, reducing the rectocele.  The excess vaginal mucosa was trimmed.  The vaginal mucosa was then closed using interrupted figure-of-X sutures of 0 Vicryl.  An excellent anatomic result was achieved.  Hemostasis was present.  A vaginal packing was then placed for additional tamponade, and the Trejo catheter was removed.  The patient was then recalled from general anesthesia, extubated and brought to the recovery room in excellent condition, having tolerated the procedure well.  All sponge and needle counts were correct.  A debriefing was performed, at which time the patient's identity and the surgery that was performed was reviewed.  From a gynecologic perspective, this included a da Ami supracervical hysterectomy, bilateral salpingo-oophorectomy and rectocele repair, as well as lysis of adhesions.  Surgical specimens included the uterus, tubes and ovaries, a cervical nodule and vaginal mucosa.  The patient will be observed and likely be admitted to an [facility] care bed.  Discharge is planned either later this evening or perhaps tomorrow.  The patient was in excellent condition following the procedure and transported to the recovery room.         ADAL BAER MD             D: 2018   T: 2018   MT: JONATHON      Name:     FESTUS MULLER   MRN:      [id number]        Account:        [id number]   :      1945           Procedure Date: 2018      Document: [id number]       cc: Adal Baer MD

## 2018-09-26 NOTE — IP AVS SNAPSHOT
[facility]    [address] [place] [id number]    Phone:  [phone]                                       After Visit Summary   9/26/2018    Amaya Fregoso    MRN: [id number]           After Visit Summary Signature Page     I have received my discharge instructions, and my questions have been answered. I have discussed any challenges I see with this plan with the nurse or doctor.    ..........................................................................................................................................  Patient/Patient Representative Signature      ..........................................................................................................................................  Patient Representative Print Name and Relationship to Patient    ..................................................               ................................................  Date                                   Time    ..........................................................................................................................................  Reviewed by Signature/Title    ...................................................              ..............................................  Date                                               Time          22EPIC Rev 08/18

## 2018-09-26 NOTE — OP NOTE
Procedure Date: 09/26/2018      PREOPERATIVE DIAGNOSIS:  Cystocele, uterine prolapse, and rectocele.      POSTOPERATIVE DIAGNOSIS:  Cystocele, uterine prolapse, and rectocele.      PROCEDURE:  Robotically-assisted sacrocolpopexy and cystoscopy by Dr. Reyes.       SURGEON:  James Reyes MD      FIRST ASSISTANT:  Kendal Reyes PA-C      SECOND ASSISTANT:  Dr. Baer as well as Rachel Huizar      ESTIMATED BLOOD LOSS:  10 mL       In conjunction with robotically-assisted supracervical hysterectomy, lysis of adhesions and rectocele repair by Dr. Baer.      FIRST ASSISTANT:  James Reyes MD       SECOND ASSIST:  Kendal Reyes PA-C       SPECIMENS:  Uterus with bilateral fallopian tubes and ovaries, and vaginal mucosa from rectocele.       INDICATION FOR THE PROCEDURE:  Amaya is a 72-year-old female with a history of symptomatic cystocele, uterine prolapse and rectocele, who also denies any stress incontinence.  She underwent urodynamic evaluation that ruled out occult stress incontinence and was consented for reconstructive surgery.  She understands risks of the procedure including bleeding, infection, de elizabeth urge and stress incontinence, mesh erosion, dyspareunia, urinary retention, need for additional surgery, small-bowel obstruction.  She also received FDA warning regarding all vaginally placed meshes.      DETAILS OF THE PROCEDURE:  Amaya was brought to the operating room, placed in supine position.  After excellent induction of general anesthesia, her perineum was prepped and draped in regular fashion as well as abdominal area.  Trejo catheter was introduced.  Midline incision was made above the umbilicus and peritoneum was insufflated using a Veress needle to the pressure of 15.  She dropped her heart rate; therefore, we had to decrease our insufflation pressure to 12 which was maintained steadily throughout the case and her bradycardia improved significantly to the 60s.  Additional  8-mm incisions x3 were made throughout the abdomen as well as additional 5-mm air port seal in the right abdomen.  The Xi robot was docked in after the patient was turned to steep Trendelenburg position.  Dissection was initiated by Dr. Baer who also performed lysis of adhesions by mobilizing some omentum and small bowel cephalad.  Please see Dr. Baer's note for his part of the procedure, which I assisted.  Once the uterus together with fallopian tubes and ovaries were positioned in the EndoCatch bag, I proceeded by identifying peritoneum on top of the sacral promontory which was opened up and 2-0 Prolene sutures were placed through anterior longitudinal ligaments x2.  Peritoneum was opened all the way to the cervical cuff.  Of note, the patient had significant amount of cystocele; therefore, I stayed a lot on the anterior vaginal wall and dissected approximately almost 10 cm distal to the cervix.  Posteriorly I also performed dissection of 4 or 5 cm over the posterior vaginal wall.  Once the dissection was complete, I positioned polypropylene type 1 mesh from Coloplast kit called Restorelle over anterior and posterior surfaces of the vagina.  I used 2-0 Ethibond anteriorly x6 sutures with additional 2-0 PDS x6 more distally over the distal portion of the vagina posteriorly.  I also sutured the graft to the cervix using 2-0 Ethibond posteriorly x6 over the posterior vaginal wall with addition of 2-0 PDS x3 over the distal posterior vaginal wall.  Once the graft appeared to be nicely adhered to the cervix, I adjusted tension-free positioning of the graft and sutured the long portion of the Y mesh to the already preplaced sutures over the sacrum.  All extra parts of the graft were trimmed and removed.  The graft was completely retroperitonealized by putting peritoneum on top of it and using 2-0 Vicryl in a running fashion.  While the specimen was delivered by Dr. Baer's team, I proceeded with cystoscopy that  demonstrated no stitch, no mesh in the bladder or urethra.  I was able to see efflux of urine coming from both ureteral orifices.  Exam under anesthesia did demonstrate excellent reduction of the cystocele with just residual rectocele being left.  All abdominal incisions were closed.  The midline was closed with a fascial closure by Dr. Baer and all incisional sites were closed using Monocryl and Dermabond to the skin.  Dr. Baer proceeded with the rectocele repair, which I assisted.  Please see his part of the procedure for that.  At the completion of the procedure, Trejo catheter was removed and vaginal packing remained.  The plan as of now would be to give her a voiding trial and discontinue vaginal pack prior to her discharge.             ROQUE YATES MD             D: 2018   T: 2018   MT: PATY      Name:     FESTUS MULLER   MRN:      [id number]        Account:        [id number]   :      1945           Procedure Date: 2018      Document: [id number]

## 2018-09-26 NOTE — ANESTHESIA PREPROCEDURE EVALUATION
Anesthesia Evaluation     . Pt has had prior anesthetic.            ROS/MED HX    ENT/Pulmonary:      (-) asthma and sleep apnea   Neurologic:     (+)migraines,     Cardiovascular: Comment: SR, 1st degree AVB, RBBB    (+) Dyslipidemia, ----. : . . . :. .      (-) PARRA, orthopnea/PND and syncope   METS/Exercise Tolerance:  >4 METS   Hematologic:         Musculoskeletal:         GI/Hepatic:     (+) GERD Asymptomatic on medication,       Renal/Genitourinary:         Endo:         Psychiatric: Comment: Panic attacks     (+) psychiatric history anxiety and depression      Infectious Disease:         Malignancy:         Other:                     Physical Exam      Airway   Mallampati: II  TM distance: >3 FB  Neck ROM: full    Dental   (+) caps    Cardiovascular   Rhythm and rate: regular      Pulmonary    breath sounds clear to auscultation                    Anesthesia Plan      History & Physical Review  History and physical reviewed and following examination; no interval change.    ASA Status:  2 .        Plan for General and ETT   PONV prophylaxis:  Ondansetron (or other 5HT-3) and Dexamethasone or Solumedrol  Additional equipment: Videolaryngoscope I placed the IV in the right arm times one without issues.  Others had tried the IV in the left hand previously.        Postoperative Care      Consents  Anesthetic plan, risks, benefits and alternatives discussed with:  Patient..                          .  Procedure: Procedure(s):  DAVINCI XI HYSTERECTOMY SUPRACERVICAL  DAVINCI XI SACROCOLPOPEXY, MIDURETHRAL SLING, CYSTOSCOPY  COLPORRHAPHY POSTERIOR  Preop diagnosis: genital prolapse     Allergies   Allergen Reactions     Vicodin [Hydrocodone-Acetaminophen] Nausea and Vomiting     Past Medical History:   Diagnosis Date     Anxiety and depression      GERD (gastroesophageal reflux disease)      HSV-2 infection      Migraines      Panic attack      Past Surgical History:   Procedure Laterality Date     COLONOSCOPY        DILATION AND CURETTAGE       LASIK BILATERAL       TONSILLECTOMY       TUBAL LIGATION       Social History   Substance Use Topics     Smoking status: Not on file     Smokeless tobacco: Not on file     Alcohol use Not on file     Prior to Admission medications    Medication Sig Start Date End Date Taking? Authorizing Provider   Acetaminophen (TYLENOL PO) Take 1,000 mg by mouth daily as needed for mild pain or fever   Yes Reported, Patient   Carboxymethylcellulose Sod PF (REFRESH PLUS) 0.5 % SOLN ophthalmic solution Place 1 drop into both eyes daily as needed for dry eyes   Yes Reported, Patient   FAMCICLOVIR PO Take 250 mg by mouth 2 times daily as needed (outbreaks) (2 x 125mg = 250mg) for five days.   Yes Reported, Patient   Ibuprofen (ADVIL PO) Take 400 mg by mouth 2 times daily as needed for moderate pain   Yes Reported, Patient   OMEPRAZOLE PO Take 20 mg by mouth daily   Yes Reported, Patient   Rizatriptan Benzoate (MAXALT-MLT PO) Take 10 mg by mouth once as needed for migraine   Yes Reported, Patient   vitamin B complex with vitamin C (VITAMIN  B COMPLEX) TABS tablet Take 1 tablet by mouth daily   Yes Reported, Patient   VITAMIN D, CHOLECALCIFEROL, PO Take 2,000 Units by mouth daily   Yes Reported, Patient     Current Facility-Administered Medications Ordered in Epic   Medication Dose Route Frequency Last Rate Last Dose     ceFAZolin (ANCEF) 1 g vial to attach to  ml bag for ADULT or 50 ml bag for PEDS  1 g Intravenous See Admin Instructions         ceFAZolin (ANCEF) intermittent infusion 2 g in 100 mL dextrose PRE-MIX  2 g Intravenous Pre-Op/Pre-procedure x 1 dose         ceFAZolin (ANCEF) intermittent infusion 2 g in 100 mL dextrose PRE-MIX  2 g Intravenous Pre-Op/Pre-procedure x 1 dose         No current [facility]-ordered outpatient prescriptions on file.       Wt Readings from Last 1 Encounters:   09/26/18 63.6 kg (140 lb 4.8 oz)     Temp Readings from Last 1 Encounters:   09/26/18 37  C (98.6  F)  (Temporal)     BP Readings from Last 6 Encounters:   09/26/18 (!) 150/93     Pulse Readings from Last 4 Encounters:   No data found for Pulse     Resp Readings from Last 1 Encounters:   09/26/18 16     SpO2 Readings from Last 1 Encounters:   09/26/18 99%     No results for input(s): NA, POTASSIUM, CHLORIDE, CO2, ANIONGAP, GLC, BUN, CR, LARY in the last 89446 hours.  No results for input(s): AST, ALT, ALKPHOS, BILITOTAL, LIPASE in the last 97058 hours.  No results for input(s): WBC, HGB, PLT in the last 30770 hours.  Recent Labs   Lab Test  09/26/18   0634   ABO  PENDING     No results for input(s): INR, PTT in the last 21922 hours.   No results for input(s): TROPI in the last 30973 hours.  No results for input(s): PH, PCO2, PO2, HCO3 in the last 36804 hours.  No results for input(s): HCG in the last 56552 hours.  No results found for this or any previous visit (from the past 744 hour(s)).    RECENT LABS:   ECG:   ECHO:

## 2018-09-26 NOTE — BRIEF OP NOTE
[facility] Brief Operative Note    Pre-operative diagnosis:  Symptomatic pelvic organ prolapse   Post-operative diagnosis same, with minimal pelvic adhesions, cervical nodule   Procedure: Procedure(s):  ROBOTIC ASSISTED SUPRACERVICAL HYSTERECTOMY, BILATERAL SALPINGO OOPHORECTOMY.LYSIS OF ADHESIONS. (ROSIE) ROBOTIC SACROCOLPOPEXY, CYSTOSCOPY, (AB)  RECTOCELE (DR BAER)  - Wound Class: I-Clean   - Wound Class: II-Clean Contaminated   - Wound Class: II-Clean Contaminated   - Wound Class: II-Clean Contaminated   - Wound Class: I-Clean   Surgeon(s): Surgeon(s) and Role:  Panel 1:     * Angel Baer MD - Primary    Panel 2:     * James Reyes MD - Primary     * Kendal Reyes PA-C - Assisting    Panel 3:     * Angel Baer MD - Primary   Estimated blood loss: * No values recorded between 9/26/2018  8:12 AM and 9/26/2018 10:56 AM *    Specimens:   ID Type Source Tests Collected by Time Destination   A :  Organ Uterus with Bilateral Ovaries and Fallopian Tubes SURGICAL PATHOLOGY EXAM Angel Baer MD 9/26/2018 10:11 AM    B : cervical nodules Tissue Cervix SURGICAL PATHOLOGY EXAM Angel Baer MD 9/26/2018 10:09 AM    C : vaginal mucosa Tissue Vagina SURGICAL PATHOLOGY EXAM Angel Baer MD 9/26/2018 10:35 AM       Findings: Grade III cystocele and uterine/apical prolapse, grade I rectocele.  Uterus normal size, anterior.  Small, 1.0 cm soft tissue nodule anteriorly at the junction of the uterus and cervix, excised and submitted.  2 cm right sided paraovarian mass, smooth walled, removed with the specimens and submitted.  At the conclusion of the sacrocolpopexy, a grade I rectocele remained.  The rectocele was repaired in the usual fashion.  No apparent complications, no evidence of injury to surrounding tissues or organs.

## 2018-09-26 NOTE — PLAN OF CARE
Problem: Hysterectomy (Adult)  Goal: Anesthesia/Sedation Recovery  Outcome: Improving  RN: pt is alert, oriented, still mildly groggy from anesthesia. Abdomen with approximated, slightly ecchymotic lap sites.  Tolerating liquids, ordered a little breakfast (egg, bread) and is tolerating thus far as of 1515.  Plans to walk and attempt to void at 1530.  One dose IV dilaudid 0.2mg administered at 1506 for pain 4/10 (anticipate more pain with walking), reassessment pending.  Noted no PO pain meds are ordered.  IV toradol due at 1730.

## 2018-09-26 NOTE — BRIEF OP NOTE
[facility] Brief Operative Note    Pre-operative diagnosis: genital prolapse    Post-operative diagnosis   Same   Procedure: Procedure(s):  ROBOTIC ASSISTED SUPRACERVICAL HYSTERECTOMY, BILATERAL SALPINGO OOPHORECTOMY.LYSIS OF ADHESIONS. (ROSIE) ROBOTIC SACROCOLPOPEXY, CYSTOSCOPY, (AB)  RECTOCELE (DR BAER)  - Wound Class: I-Clean   - Wound Class: II-Clean Contaminated   - Wound Class: II-Clean Contaminated   - Wound Class: II-Clean Contaminated   - Wound Class: I-Clean   Surgeon(s): Surgeon(s) and Role:  Panel 1:     * Angel Baer MD - Primary    Panel 2:     * James Reyes MD - Primary     * Kendal Reyes PA-C - Assisting    Panel 3:     * Angel Baer MD - Primary   Estimated blood loss: * 10 cc *    Specimens:   ID Type Source Tests Collected by Time Destination   A :  Organ Uterus with Bilateral Ovaries and Fallopian Tubes SURGICAL PATHOLOGY EXAM Angel Baer MD 9/26/2018 10:11 AM    B : cervical nodules Tissue Cervix SURGICAL PATHOLOGY EXAM Angel Baer MD 9/26/2018 10:09 AM    C : vaginal mucosa Tissue Vagina SURGICAL PATHOLOGY EXAM Angel Baer MD 9/26/2018 10:35 AM       Findings: Grade III cystocele and uterine/apical prolapse, grade I rectocele.

## 2018-09-26 NOTE — INTERVAL H&P NOTE
I met with the patient and her sister in the pre-induction area and once again discussed the proposed surgery, pros, cons, risks and benefits.  She is aware that the GYN portion of the procedure will be a daVinci supracervical hysterectomy, bilateral salpingo-oophorectomy and possible rectocele repair.  She understands that mesh material will be used for this surgery.  All questions were answered, including regarding the post operative recovery and the patient expressed understanding.  We plan an overnight observational care stay with discharge anticipated tomorrow midday.  The patient did have a small hyperechoic area on the right ovary and a CA-125 test was normal.  We will send all surgical specimens to the pathologist for full review.  She was informed.

## 2018-09-27 VITALS
WEIGHT: 140.3 LBS | TEMPERATURE: 98.3 F | RESPIRATION RATE: 16 BRPM | SYSTOLIC BLOOD PRESSURE: 128 MMHG | OXYGEN SATURATION: 93 % | DIASTOLIC BLOOD PRESSURE: 70 MMHG | BODY MASS INDEX: 27.55 KG/M2 | HEIGHT: 60 IN

## 2018-09-27 LAB — COPATH REPORT: NORMAL

## 2018-09-27 PROCEDURE — 25000128 H RX IP 250 OP 636: Performed by: PHYSICIAN ASSISTANT

## 2018-09-27 PROCEDURE — 25000132 ZZH RX MED GY IP 250 OP 250 PS 637: Performed by: PHYSICIAN ASSISTANT

## 2018-09-27 PROCEDURE — 40000934 ZZH STATISTIC OUTPATIENT (NON-OBS) DAY

## 2018-09-27 RX ORDER — OXYBUTYNIN CHLORIDE 5 MG/1
5 TABLET, EXTENDED RELEASE ORAL DAILY PRN
Qty: 30 TABLET | Refills: 0 | Status: SHIPPED | OUTPATIENT
Start: 2018-09-27

## 2018-09-27 RX ORDER — IBUPROFEN 600 MG/1
600 TABLET, FILM COATED ORAL EVERY 6 HOURS PRN
Status: DISCONTINUED | OUTPATIENT
Start: 2018-09-27 | End: 2018-09-27 | Stop reason: HOSPADM

## 2018-09-27 RX ORDER — ESTRADIOL 0.1 MG/G
1 CREAM VAGINAL DAILY
Qty: 30 G | Refills: 0 | Status: SHIPPED | OUTPATIENT
Start: 2018-09-27

## 2018-09-27 RX ADMIN — KETOROLAC TROMETHAMINE 15 MG: 15 INJECTION, SOLUTION INTRAMUSCULAR; INTRAVENOUS at 00:40

## 2018-09-27 RX ADMIN — IBUPROFEN 600 MG: 600 TABLET, FILM COATED ORAL at 11:44

## 2018-09-27 RX ADMIN — KETOROLAC TROMETHAMINE 15 MG: 15 INJECTION, SOLUTION INTRAMUSCULAR; INTRAVENOUS at 06:34

## 2018-09-27 NOTE — PROGRESS NOTES
[facility]    Urology Progress Note     Assessment & Plan Amaya Fregoso is a 72 year old female POD#1 sacrocolpopexy. AVSS, afebrile. Pain controlled with toradol. Good UOP. Crook was removed intraoperatively but patient had to be straight cathed for 800cc urine overnight. BUS this morning for 350. Vaginal packing removed at 0600. Crook replaced and will follow up with ToV on Monday. Ambulating without difficulty and tolerating diet without nausea. Discussed activity restrictions in detail.    Plan:    Replace crook catheter. RN to teach cares and provide leg bag    Discharge later today    Home with Cipro, colace, estrace, oxycodone    Follow up on Monday for ToV    Follow up with Dr. Reyes in 1 month    Fan Wood PA-C 9/27/2018 10:42 AM  [facility]  Mon-Fri, 7am - 5pm  Text page [phone]  Office: [phone]      Interval History   Ambulating without difficulty. Pain controlled. No history of retention. No dysuria, hematuria, frequency, urgency. Ate breakfast and is tolerating it well. Vicodin makes her nauseated but has used oxycodone previously without issue.    Physical Exam   Temp: 98.3  F (36.8  C) Temp src: Oral BP: 128/70   Heart Rate: 56 Resp: 16 SpO2: 93 % O2 Device: None (Room air) Oxygen Delivery: 1 LPM  Vitals:    09/26/18 0630   Weight: 63.6 kg (140 lb 4.8 oz)     Vital Signs with Ranges  Temp:  [95.7  F (35.4  C)-98.3  F (36.8  C)] 98.3  F (36.8  C)  Heart Rate:  [51-57] 56  Resp:  [10-20] 16  BP: (110-132)/(57-72) 128/70  SpO2:  [91 %-100 %] 93 %  I/O last 3 completed shifts:  In: 1720 [P.O.:120; I.V.:1600]  Out: 2410 [Urine:2400; Blood:10]    General: Patient reclined in bed resting comfortably NAD  Head: Normocephalic, atraumatic  Eyes: No icterus  Neck: Symmetric  Respiratory: Breathing unlabored  Cardiac: Skin well-perfused  GI: Soft, appropriate tenderness, non-distended, no SP tenderness  Skin: Incisions CDI with dermabond, no visible rashes    Extremities: No LE edema, no calf pain  Neurologic: No focal deficits  Neuropsychiatric: A&O x 3, responding appropriately      Medications     sodium chloride Stopped (09/27/18 0232)       ketorolac  15 mg Intravenous Q6H     sodium chloride (PF)  3 mL Intracatheter Q8H       Data   No results found for this or any previous visit (from the past 24 hour(s)).     # Pain Assessment:  Current Pain Score 9/27/2018   Patient currently in pain? denies   Pain score (0-10) -   Pain location -   Pain descriptors -   - Amaya is experiencing pain due to surgery. Pain management was discussed and the plan was created in a collaborative fashion.  Amaya's response to the current recommendations: engaged  - Please see the plan for pain management as documented above

## 2018-09-27 NOTE — PLAN OF CARE
Problem: Patient Care Overview  Goal: Plan of Care/Patient Progress Review  Outcome: Adequate for Discharge Date Met: 09/27/18  Pt A&O, VSS. Pain controlled. Lap sites CDI. Trejo catheter in place and draining adequately. Minor vag spotting. Trejo education given to patient by resource RN, reinforced education by writer. Ok for discharge home per MD. All discharge instructions, medications, and f/u recommendations reviewed with patient and sister. All questions answered. IV removed. Escorted to exit via wheelchair by aid.

## 2018-09-27 NOTE — PROGRESS NOTES
Education complete on changing of leg catheter bag. Pt and sister have no further questions at this time. Pt to follow up at the clinic on Monday.

## 2018-09-27 NOTE — PLAN OF CARE
Problem: Patient Care Overview  Goal: Plan of Care/Patient Progress Review  Outcome: Improving  Patient Ax4, VSS on room air, pain rated 2/10, PO ibuprofen given, she refused toradol.  Trejo catheter placed for urine retention.  She will be discharged with it and f/u Monday with urology for voiding trial.  She has some pain in left upper shoulder r/t gas from procedure.  Tolerated regular diet, no nausea.

## 2018-09-27 NOTE — PROVIDER NOTIFICATION
MD Notification    Notified Person: MD    Notified Person Name: Sharona PAREDES    Notification Date/Time: 9/26/2018/ 08:08 PM    Notification Interaction: phone    Purpose of Notification: PTA Maxalt for migraines.    Orders Received: Awaiting call back.    Comments:    At 08:11 PM  MD called back and verbal order to start PTA med. See order

## 2018-09-28 NOTE — PROGRESS NOTES
Gyn Post-operative Note POD# 1    S:  Patient doing well other than migraine headache overnight.   Pain well controlled on pain medication.  Ambulating.  Tolerating clear liquid diet.  Trial of voiding to commence.      O:   /70  Temp 98.3  F (36.8  C) (Oral)  Resp 16  Ht 1.524 m (5')  Wt 63.6 kg (140 lb 4.8 oz)  SpO2 93%  BMI 27.4 kg/m2   I/O this shift:  In: 1720 [P.O.:120; I.V.:1600]  Out: 3910 [Urine:3900; Blood:10]  Gen- A&O, NAD  Abd- soft, non-tender, non-distended, no guarding or rebound  Incision(s)- C/D/I  Ext- Non-tender, no edema    Labs:  No results found for: HGB]     Pathology pending    A/P:  72 year old POD# 1 s/p minimally invasive prolapse surgery.  Doing well POD 1.  Voiding trial to commence prior to discharge.     1.  Routine post-op cares  2.  Advance cares per routine  3.  Ambulate  4.  Analgesia  5.  The plan of care was discussed with the patient.  She expressed understanding and agreement.  6.  Discharge later today.  Return to see me in 6 weeks or so for a post op visit.   7.  Voiding trial to assure adequate emptying prior to discharge.     Angel Baer  9/28/2018  7:33 AM

## 2018-12-15 NOTE — PROGRESS NOTES
A&O, VSS, regular diet, up with assist +1. Pain controlled with scheduled toradol. Episode of migraine headache treated with maxalt. Vaginal packing and crook removed prior to arriving to unit. Urine retention causing need for straight cath with 800 ml return and 104 ml left on bladder scan. Slight vaginal spotting, 4 lap sites WDL, CMS intact.   Statement Selected

## 2021-02-20 NOTE — PLAN OF CARE
Problem: Patient Care Overview  Goal: Plan of Care/Patient Progress Review  Outcome: No Change  Pt A&O, VSS. On 1L O2. Capno. Pain controlled, IV toradol. C/o of migraine headache, order received for po maxalt, given. Some nausea, prn zofran and compazine given, nausea improved. 4 lap sites, some bruising, CDI. Packing removed, spotting. Voiding very small amounts last , orders to straight cath for PVR > 300. Reg diet. SBA, ambulating halls. IV infusing. Will cont to monitor.        [id number]

## 2021-12-01 ENCOUNTER — TRANSFERRED RECORDS (OUTPATIENT)
Dept: HEALTH INFORMATION MANAGEMENT | Facility: CLINIC | Age: 76
End: 2021-12-01

## 2023-04-20 NOTE — OR NURSING
Phone report given to RN and patient transferred, with belongings bag and suitcase, to Observation Unit.   None

## (undated) DEVICE — DRAPE IOBAN INCISE 23X17" 6650EZ

## (undated) DEVICE — SU MONOCRYL 4-0 PS-2 18" UND Y496G

## (undated) DEVICE — DAVINCI XI DRAPE COLUMN 470341

## (undated) DEVICE — SU VICRYL 2-0 SH 27" J317H

## (undated) DEVICE — DAVINCI XI NDL DRIVER MEGA SUTURE CUT 8MM 470309

## (undated) DEVICE — KIT PATIENT POSITIONING PIGAZZI LATEX FREE 40580

## (undated) DEVICE — SU VICRYL 0 CT-2 CR 8X18" J727D

## (undated) DEVICE — SU ETHIBOND 2-0 SHDA 30" X563H

## (undated) DEVICE — SOL NACL 0.9% INJ 1000ML BAG 2B1324X

## (undated) DEVICE — SU VICRYL 2-0 CT-2 27" J333H

## (undated) DEVICE — DAVINCI HOT SHEARS TIP COVER  400180

## (undated) DEVICE — CATH TRAY FOLEY SURESTEP 16FR WDRAIN BAG STLK LATEX A300316A

## (undated) DEVICE — DAVINCI XI GRASPER ENDOWRIST BIPOLAR LONG 470400

## (undated) DEVICE — SU DERMABOND ADVANCED .7ML DNX12

## (undated) DEVICE — PACK DAVINCI GYN SMA15GDFS1

## (undated) DEVICE — DAVINCI XI OBTURATOR BLADELESS 8MM 470359

## (undated) DEVICE — SOL WATER IRRIG 1000ML BOTTLE 2F7114

## (undated) DEVICE — SYR 10ML FINGER CONTROL W/O NDL 309695

## (undated) DEVICE — NDL INSUFFLATION 13GA 120MM C2201

## (undated) DEVICE — SOL WATER IRRIG 3000ML BAG 2B7117

## (undated) DEVICE — GLOVE PROTEXIS BLUE W/NEU-THERA 7.0  2D73EB70

## (undated) DEVICE — DAVINCI XI DRAPE ARM 470015

## (undated) DEVICE — TUBING IRRIG TUR Y TYPE 96" LF 6543-01

## (undated) DEVICE — ESU PENCIL W/HOLSTER E2350H

## (undated) DEVICE — SUCTION IRR STRYKERFLOW II W/TIP 250-070-520

## (undated) DEVICE — ENDO TROCAR CONMED AIRSEAL BLADELESS 05X120MM IAS5-120LP

## (undated) DEVICE — GLOVE PROTEXIS W/NEU-THERA 6.5  2D73TE65

## (undated) DEVICE — BLADE CLIPPER 4406

## (undated) DEVICE — DAVINCI XI GRASPER ENDOWRIST PROGRASP 470093

## (undated) DEVICE — DAVINCI XI MONOPOLAR SCISSORS HOT SHEARS 8MM 470179

## (undated) DEVICE — SU VICRYL 0 UR-6 27" J603H

## (undated) DEVICE — Device

## (undated) DEVICE — SU PDS II 3-0 SH 27" Z316H

## (undated) DEVICE — DAVINCI XI NDL DRIVER LARGE 470006

## (undated) DEVICE — SUCTION CANISTER MEDIVAC LINER 3000ML W/LID 65651-530

## (undated) DEVICE — ESU GROUND PAD UNIVERSAL W/O CORD

## (undated) DEVICE — LINEN TOWEL PACK X5 5464

## (undated) DEVICE — TUBING CONMED AIRSEAL SMOKE EVAC INSUFFLATION ASM-EVAC

## (undated) DEVICE — SU PROLENE 2-0 V-7 36" 8977H

## (undated) DEVICE — DAVINCI XI SEAL UNIVERSAL 5-8MM 470361

## (undated) DEVICE — ENDO POUCH UNIVERSAL RETRIEVAL SYSTEM INZII 5MM CD003

## (undated) DEVICE — GOWN IMPERVIOUS SPECIALTY XL/XLONG 39049

## (undated) RX ORDER — LIDOCAINE HYDROCHLORIDE 20 MG/ML
INJECTION, SOLUTION EPIDURAL; INFILTRATION; INTRACAUDAL; PERINEURAL
Status: DISPENSED
Start: 2018-09-26

## (undated) RX ORDER — PROPOFOL 10 MG/ML
INJECTION, EMULSION INTRAVENOUS
Status: DISPENSED
Start: 2018-09-26

## (undated) RX ORDER — FENTANYL CITRATE 50 UG/ML
INJECTION, SOLUTION INTRAMUSCULAR; INTRAVENOUS
Status: DISPENSED
Start: 2018-09-26

## (undated) RX ORDER — HYDROMORPHONE HYDROCHLORIDE 1 MG/ML
INJECTION, SOLUTION INTRAMUSCULAR; INTRAVENOUS; SUBCUTANEOUS
Status: DISPENSED
Start: 2018-09-26

## (undated) RX ORDER — VASOPRESSIN 20 U/ML
INJECTION PARENTERAL
Status: DISPENSED
Start: 2018-09-26

## (undated) RX ORDER — BUPIVACAINE HYDROCHLORIDE 2.5 MG/ML
INJECTION, SOLUTION EPIDURAL; INFILTRATION; INTRACAUDAL
Status: DISPENSED
Start: 2018-09-26

## (undated) RX ORDER — BUPIVACAINE HYDROCHLORIDE AND EPINEPHRINE 5; 5 MG/ML; UG/ML
INJECTION, SOLUTION EPIDURAL; INTRACAUDAL; PERINEURAL
Status: DISPENSED
Start: 2018-09-26

## (undated) RX ORDER — ONDANSETRON 2 MG/ML
INJECTION INTRAMUSCULAR; INTRAVENOUS
Status: DISPENSED
Start: 2018-09-26

## (undated) RX ORDER — CEFAZOLIN SODIUM 1 G/3ML
INJECTION, POWDER, FOR SOLUTION INTRAMUSCULAR; INTRAVENOUS
Status: DISPENSED
Start: 2018-09-26

## (undated) RX ORDER — CEFAZOLIN SODIUM 2 G/100ML
INJECTION, SOLUTION INTRAVENOUS
Status: DISPENSED
Start: 2018-09-26

## (undated) RX ORDER — KETOROLAC TROMETHAMINE 30 MG/ML
INJECTION, SOLUTION INTRAMUSCULAR; INTRAVENOUS
Status: DISPENSED
Start: 2018-09-26

## (undated) RX ORDER — PHENAZOPYRIDINE HYDROCHLORIDE 200 MG/1
TABLET, FILM COATED ORAL
Status: DISPENSED
Start: 2018-09-26

## (undated) RX ORDER — DEXAMETHASONE SODIUM PHOSPHATE 4 MG/ML
INJECTION, SOLUTION INTRA-ARTICULAR; INTRALESIONAL; INTRAMUSCULAR; INTRAVENOUS; SOFT TISSUE
Status: DISPENSED
Start: 2018-09-26